# Patient Record
Sex: FEMALE | Race: WHITE | NOT HISPANIC OR LATINO | URBAN - METROPOLITAN AREA
[De-identification: names, ages, dates, MRNs, and addresses within clinical notes are randomized per-mention and may not be internally consistent; named-entity substitution may affect disease eponyms.]

---

## 2017-08-14 ENCOUNTER — OFFICE VISIT (OUTPATIENT)
Dept: FAMILY MEDICINE | Facility: CLINIC | Age: 47
End: 2017-08-14
Payer: COMMERCIAL

## 2017-08-14 VITALS
HEART RATE: 65 BPM | TEMPERATURE: 98.2 F | SYSTOLIC BLOOD PRESSURE: 100 MMHG | HEIGHT: 59 IN | WEIGHT: 117.1 LBS | RESPIRATION RATE: 16 BRPM | OXYGEN SATURATION: 99 % | DIASTOLIC BLOOD PRESSURE: 68 MMHG | BODY MASS INDEX: 23.61 KG/M2

## 2017-08-14 DIAGNOSIS — R53.83 FATIGUE, UNSPECIFIED TYPE: Primary | ICD-10-CM

## 2017-08-14 DIAGNOSIS — R63.5 WEIGHT GAIN: ICD-10-CM

## 2017-08-14 DIAGNOSIS — R49.0 HOARSENESS: ICD-10-CM

## 2017-08-14 LAB
ERYTHROCYTE [DISTWIDTH] IN BLOOD BY AUTOMATED COUNT: 13.2 % (ref 10–15)
GLUCOSE SERPL-MCNC: 89 MG/DL (ref 70–99)
HCT VFR BLD AUTO: 39.1 % (ref 35–47)
HGB BLD-MCNC: 12.7 G/DL (ref 11.7–15.7)
MCH RBC QN AUTO: 28.8 PG (ref 26.5–33)
MCHC RBC AUTO-ENTMCNC: 32.5 G/DL (ref 31.5–36.5)
MCV RBC AUTO: 89 FL (ref 78–100)
PLATELET # BLD AUTO: 269 10E9/L (ref 150–450)
RBC # BLD AUTO: 4.41 10E12/L (ref 3.8–5.2)
TSH SERPL DL<=0.005 MIU/L-ACNC: 2.48 MU/L (ref 0.4–4)
WBC # BLD AUTO: 5.3 10E9/L (ref 4–11)

## 2017-08-14 PROCEDURE — 82947 ASSAY GLUCOSE BLOOD QUANT: CPT | Performed by: FAMILY MEDICINE

## 2017-08-14 PROCEDURE — 36415 COLL VENOUS BLD VENIPUNCTURE: CPT | Performed by: FAMILY MEDICINE

## 2017-08-14 PROCEDURE — 84443 ASSAY THYROID STIM HORMONE: CPT | Performed by: FAMILY MEDICINE

## 2017-08-14 PROCEDURE — 99203 OFFICE O/P NEW LOW 30 MIN: CPT | Performed by: FAMILY MEDICINE

## 2017-08-14 PROCEDURE — 85027 COMPLETE CBC AUTOMATED: CPT | Performed by: FAMILY MEDICINE

## 2017-08-14 NOTE — MR AVS SNAPSHOT
After Visit Summary   8/14/2017    Corbin Pop    MRN: [id number]           Patient Information     Date Of Birth          1970        Visit Information        Provider Department      8/14/2017 1:10 PM Karin Hummel MD [facility]        Today's Diagnoses     Fatigue, unspecified type    -  1    Hoarseness          Care Instructions      Preventive Health Recommendations  Female Ages 40 to 49    Yearly exam:     See your health care provider every year in order to  1. Review health changes.   2. Discuss preventive care.    3. Review your medicines if your doctor prescribed any.      Get a Pap test every three years (unless you have an abnormal result and your provider advises testing more often).      If you get Pap tests with HPV test, you only need to test every 5 years, unless you have an abnormal result. You do not need a Pap test if your uterus was removed (hysterectomy) and you have not had cancer.      You should be tested each year for STDs (sexually transmitted diseases), if you're at risk.       Ask your doctor if you should have a mammogram.      Have a colonoscopy (test for colon cancer) if someone in your family has had colon cancer or polyps before age 50.       Have a cholesterol test every 5 years.       Have a diabetes test (fasting glucose) after age 45. If you are at risk for diabetes, you should have this test every 3 years.    Shots: Get a flu shot each year. Get a tetanus shot every 10 years.     Nutrition:     Eat at least 5 servings of fruits and vegetables each day.    Eat whole-grain bread, whole-wheat pasta and brown rice instead of white grains and rice.    Talk to your provider about Calcium and Vitamin D.     Lifestyle    Exercise at least 150 minutes a week (an average of 30 minutes a day, 5 days a week). This will help you control your weight and prevent disease.    Limit alcohol to one drink per day.    No smoking.     Wear sunscreen to  "prevent skin cancer.    See your dentist every six months for an exam and cleaning.          Follow-ups after your visit        Who to contact     If you have questions or need follow up information about today's clinic visit or your schedule please contact [facility] directly at [phone].  Normal or non-critical lab and imaging results will be communicated to you by MyChart, letter or phone within 4 business days after the clinic has received the results. If you do not hear from us within 7 days, please contact the clinic through MyChart or phone. If you have a critical or abnormal lab result, we will notify you by phone as soon as possible.  Submit refill requests through [employer] or call your pharmacy and they will forward the refill request to us. Please allow 3 business days for your refill to be completed.          Additional Information About Your Visit        MyChart Information     [employer] lets you send messages to your doctor, view your test results, renew your prescriptions, schedule appointments and more. To sign up, go to [email or web address] . Click on \"Log in\" on the left side of the screen, which will take you to the Welcome page. Then click on \"Sign up Now\" on the right side of the page.     You will be asked to enter the access code listed below, as well as some personal information. Please follow the directions to create your username and password.     Your access code is: [email or web address]  Expires: 2017  1:42 PM     Your access code will  in 90 days. If you need help or a new code, please call your [place] clinic or [phone].        Care EveryWhere ID     This is your Care EveryWhere ID. This could be used by other organizations to access your [facility] medical records  [id number]        Your Vitals Were     Pulse Temperature Respirations Height Pulse Oximetry BMI (Body Mass Index)    65 98.2  F (36.8  C) (Oral) 16 4' 11\" (1.499 m) 99% 23.65 kg/m2       Blood " Pressure from Last 3 Encounters:   08/14/17 100/68   03/31/11 112/69   09/20/10 97/68    Weight from Last 3 Encounters:   08/14/17 117 lb 1.6 oz (53.1 kg)   03/31/11 107 lb (48.5 kg)   09/20/10 100 lb (45.4 kg)              We Performed the Following     CBC with platelets     Glucose     TSH with free T4 reflex        Primary Care Provider    Physician No Ref-Primary       No address on file        Equal Access to Services     ROBERT GONZALEZ : Hadii aad ku laureno Somartineali, waaxda luqadaha, qaybta kaalmada adeegyada, tae arriaza . So [facility] [phone].    ATENCIÓN: Si habla español, tiene a carlin disposición servicios gratuitos de asistencia lingüística. Llame al [phone].    We comply with applicable federal civil rights laws and [place] laws. We do not discriminate on the basis of race, color, national origin, age, disability sex, sexual orientation or gender identity.            Thank you!     Thank you for choosing [facility]  for your care. Our goal is always to provide you with excellent care. Hearing back from our patients is one way we can continue to improve our services. Please take a few minutes to complete the written survey that you may receive in the mail after your visit with us. Thank you!             Your Updated Medication List - Protect others around you: Learn how to safely use, store and throw away your medicines at www.disposemymeds.org.      Notice  As of 8/14/2017  1:42 PM    You have not been prescribed any medications.

## 2017-08-14 NOTE — PROGRESS NOTES
SUBJECTIVE:                                                    Corbin Pop is a 47 year old female who presents to clinic today for the following health issues:      Here to discuss fatigue that's been ongoing.  Is also having hoarseness.  Not able to lose weight.  Would like to discuss thyroid issues and labs.        Problem list and histories reviewed & adjusted, as indicated.  Additional history:     See under ROS     Patient Active Problem List   Diagnosis     CARDIOVASCULAR SCREENING; LDL GOAL LESS THAN 160       No current outpatient prescriptions on file.     Past Medical History:   Diagnosis Date     Kidney stone     left ureterovesicular junction     NO ACTIVE PROBLEMS      Past Surgical History:   Procedure Laterality Date      SECTION  2015     Obstetric History       T1      L1     SAB0   TAB0   Ectopic0   Multiple0   Live Births0       # Outcome Date GA Lbr Sonido/2nd Weight Sex Delivery Anes PTL Lv   1 Term                 Social History   Substance Use Topics     Smoking status: Never Smoker     Smokeless tobacco: Never Used     Alcohol use Yes      Comment: moderate     She is working.  home during the day with their child.         Reviewed and updated as needed this visit by clinical staff  Allergies  Meds       Reviewed and updated as needed this visit by Provider         ROS:  CONSTITUTIONAL:NEGATIVE for fever, chills, change in weight x difficulty losing weight.  RESP:NEGATIVE for significant cough or SOB  CV: NEGATIVE for chest pain, palpitations or peripheral edema  GI: NEGATIVE for nausea, abdominal pain, heartburn, or change in bowel habits. Notes she frequently will only go every couple days.   : normal menstrual cycles; trying to get pregnant.  PSYCHIATRIC: NEGATIVE for changes in mood or affect    Wondering about thyroid.    Noting fatigue, scratchy raspy voice; problems losing weight.  The hoarseness will come and go.    Was 106 pounds prior  "to pregnancy. Got down to 110 after birth.   Son is almost 2 1/3 yo.    Denies history of gestational DM      OBJECTIVE:     /68 (BP Location: Right arm, Cuff Size: Adult Regular)  Pulse 65  Temp 98.2  F (36.8  C) (Oral)  Resp 16  Ht 4' 11\" (1.499 m)  Wt 117 lb 1.6 oz (53.1 kg)  SpO2 99%  BMI 23.65 kg/m2  Body mass index is 23.65 kg/(m^2).  GENERAL APPEARANCE: alert and no distress  NECK: no adenopathy and thyroid normal to palpation  RESP: lungs clear to auscultation - no rales, rhonchi or wheezes  CV: regular rates and rhythm  ABDOMEN: soft, nontender, without hepatosplenomegaly or masses and bowel sounds normal  MS: no edema.  SKIN: no suspicious lesions or rashes  PSYCH: mentation appears normal and affect normal/bright        ASSESSMENT/PLAN:     Fatigue, unspecified type  I do think it worthwhile checking the thyroid.  Discussed typical symptoms of both low and high.  Discussed it is common enough for one to have low index of suspicion for testing.  But also discussed some of the symptoms are nonspecific and it could be normal. May then wish to look into other possibilities. Discussed the following.  - TSH with free T4 reflex  - CBC with platelets  - Glucose    Hoarseness  Was not real obvious to me. Voice sounded more on the nasal side currently.   Discussed if big difference to her and thyroid normal, may wish to consider ENT  - TSH with free T4 reflex    Weight gain  Around pregnancy.  Did review her BMI with her and noted she is at a healthy BMI        Follow up prn or as previously directed.  She is not sure if she will be establishing here or not.    Karin Hummel MD, MD  [facility]  "

## 2017-08-14 NOTE — LETTER
August 18, 2017      Corbin Pop  [address]  [place] [address]        Dear Ms. Corbin RAHMAN Donn,    Enclosed is a copy of your recent results.  These all look normal!    TSH stands for Thyroid Stimulating Hormone. It is the most sensitive thyroid test that we have. It goes in the opposite direction of the thyroid hormone level; if your thyroid is not producing sufficient thyroid hormone, it goes up to tell your thyroid to make more.    Have a great rest of the Summer!    Sincerely,     Karin Hummel MD

## 2017-08-14 NOTE — NURSING NOTE
"Chief Complaint   Patient presents with     Fatigue       Initial /68 (BP Location: Right arm, Cuff Size: Adult Regular)  Pulse 65  Temp 98.2  F (36.8  C) (Oral)  Resp 16  Ht 4' 11\" (1.499 m)  Wt 117 lb 1.6 oz (53.1 kg)  SpO2 99%  BMI 23.65 kg/m2 Estimated body mass index is 23.65 kg/(m^2) as calculated from the following:    Height as of this encounter: 4' 11\" (1.499 m).    Weight as of this encounter: 117 lb 1.6 oz (53.1 kg).  Medication Reconciliation: complete   Elsa Eddy, [place]        "

## 2017-08-14 NOTE — PROGRESS NOTES
"   SUBJECTIVE:   CC: Corbin Pop is an 47 year old woman who presents for preventive health visit.     Physical   Annual:     Getting at least 3 servings of Calcium per day::  NO    Bi-annual eye exam::  NO    Dental care twice a year::  Yes    Sleep apnea or symptoms of sleep apnea::  Daytime drowsiness    Frequency of exercise::  None    Taking medications regularly::  Not Applicable    {Outside tests to abstract? :680467}    {additional problems to add (Optional):397743}    Today's PHQ-2 Score:   PHQ-2 ( 1999 [employer]) 8/14/2017   Q1: Little interest or pleasure in doing things 0   Q2: Feeling down, depressed or hopeless 0   PHQ-2 Score 0   Q1: Little interest or pleasure in doing things Not at all   Q2: Feeling down, depressed or hopeless Not at all   PHQ-2 Score 0       Abuse: Current or Past(Physical, Sexual or Emotional)- {YES/NO/NA:709457}  Do you feel safe in your environment - {YES/NO/NA:017999}    Social History   Substance Use Topics     Smoking status: Never Smoker     Smokeless tobacco: Not on file     Alcohol use Yes     {ETOH AUDIT:018993}    Reviewed orders with patient.  Reviewed health maintenance and updated orders accordingly - {Yes/No:126040::\"Yes\"}  {Chronicprobdata (Optional):012604}    {Mammo Decision Support (Optional):486861}    Pertinent mammograms are reviewed under the imaging tab.  History of abnormal Pap smear: {PAP HX:505782}    Reviewed and updated as needed this visit by clinical staff         Reviewed and updated as needed this visit by Provider        {HISTORY OPTIONS (Optional):253260}    ROS:  {FEMALE PREVENTATIVE ROS:475384}     OBJECTIVE:   There were no vitals taken for this visit.  EXAM:  {Exam Choices:344249}    ASSESSMENT/PLAN:   {Diag Picklist:977263}    COUNSELING:  {FEMALE COUNSELING MESSAGES:650952::\"Reviewed preventive health counseling, as reflected in patient instructions\"}    {BP Counseling- Complete if BP >= 120/80  (Optional):946897}     reports that she has " "never smoked. She does not have any smokeless tobacco history on file.  {Tobacco Cessation -- Complete if patient is a smoker (Optional):644864}  Estimated body mass index is 21.61 kg/(m^2) as calculated from the following:    Height as of 3/31/11: 4' 11\" (1.499 m).    Weight as of 3/31/11: 107 lb (48.5 kg).   {Weight Management Plan (ACO) Complete if BMI is abnormal-  Ages 18-64  BMI >24.9.  Age 65+ with BMI <23 or >30 (Optional):255223}    Counseling Resources:  ATP IV Guidelines  Pooled Cohorts Equation Calculator  Breast Cancer Risk Calculator  FRAX Risk Assessment  ICSI Preventive Guidelines  Dietary Guidelines for Americans, 2010  USDA's MyPlate  ASA Prophylaxis  Lung CA Screening    Karin Hummel MD, MD  [facility]  Answers for HPI/ROS submitted by the patient on 8/14/2017   PHQ-2 Score: 0    "

## 2017-12-18 ENCOUNTER — TELEPHONE (OUTPATIENT)
Dept: FAMILY MEDICINE | Facility: CLINIC | Age: 47
End: 2017-12-18

## 2017-12-18 NOTE — TELEPHONE ENCOUNTER
Panel Management Review      Patient has the following on her problem list: None      Composite cancer screening  Chart review shows that this patient is due/due soon for the following Pap Smear  Summary:    Patient is due/failing the following:   LDL and PAP    Action needed:   Patient needs office visit for Physical, pap and fasting labs-lipids    Type of outreach:    Phone, left message for patient to call back.     Questions for provider review:    None                                                                                                                                    Elsa Eddy, [place]       Chart routed to none .

## 2018-02-10 ENCOUNTER — RADIANT APPOINTMENT (OUTPATIENT)
Dept: MAMMOGRAPHY | Facility: CLINIC | Age: 48
End: 2018-02-10
Payer: COMMERCIAL

## 2018-02-10 DIAGNOSIS — Z00.00 PREVENTATIVE HEALTH CARE: ICD-10-CM

## 2018-02-10 PROCEDURE — 77067 SCR MAMMO BI INCL CAD: CPT | Mod: TC
